# Patient Record
Sex: FEMALE | Race: WHITE | NOT HISPANIC OR LATINO | Employment: OTHER | URBAN - METROPOLITAN AREA
[De-identification: names, ages, dates, MRNs, and addresses within clinical notes are randomized per-mention and may not be internally consistent; named-entity substitution may affect disease eponyms.]

---

## 2021-04-28 NOTE — TELEPHONE ENCOUNTER
FUTURE VISIT INFORMATION      FUTURE VISIT INFORMATION:    Date: 5.3.21    Time: 3:45    Location: Telephone  REFERRAL INFORMATION:    Referring provider:  Dr. Fariba Salazar    Referring providers clinic:  [facility]    Reason for visit/diagnosis  SCC Lower left leg,     RECORDS REQUESTED FROM:       Clinic name Comments Records Status Photos Status   [facility] 4.2.21  Dr. Salazar  Path # [id number] CE Received  Sent to scanning

## 2021-05-03 ENCOUNTER — PRE VISIT (OUTPATIENT)
Dept: DERMATOLOGY | Facility: CLINIC | Age: 71
End: 2021-05-03

## 2021-05-03 ENCOUNTER — VIRTUAL VISIT (OUTPATIENT)
Dept: DERMATOLOGY | Facility: CLINIC | Age: 71
End: 2021-05-03
Payer: MEDICARE

## 2021-05-03 DIAGNOSIS — C44.729 SQUAMOUS CELL CARCINOMA OF SKIN OF LEFT LOWER EXTREMITY: Primary | ICD-10-CM

## 2021-05-03 PROCEDURE — 99442 PR PHYSICIAN TELEPHONE EVALUATION 11-20 MIN: CPT | Mod: 95 | Performed by: DERMATOLOGY

## 2021-05-03 RX ORDER — ALPRAZOLAM 0.5 MG
0.5 TABLET ORAL 2 TIMES DAILY PRN
COMMUNITY
Start: 2021-04-23

## 2021-05-03 RX ORDER — ALBUTEROL SULFATE 90 UG/1
108 AEROSOL, METERED RESPIRATORY (INHALATION) PRN
COMMUNITY
Start: 2020-10-18

## 2021-05-03 RX ORDER — FERROUS GLUCONATE 324(38)MG
324 TABLET ORAL PRN
COMMUNITY
Start: 2020-08-17

## 2021-05-03 ASSESSMENT — PAIN SCALES - GENERAL: PAINLEVEL: NO PAIN (0)

## 2021-05-03 NOTE — PROGRESS NOTES
[facility] Dermatology Note  Encounter Date: May 3, 2021  Store-and-Forward and Telephone ([phone]). Location of teledermatologist: [facility] DERMATOLOGIC SURGERY CLINIC [place].  Start time: 3:10 PM. End time: 3:30 PM.    Dermatology Problem List:  Dermatology Problem List:  1. SCC, left shin, biopsied at [facility] 4/2/21, pending MMS 5/11/21  2. Sebopsoriasis - lidex solution, keto shampoo, lidex shampoo  3. Onychodystrophy / onychomycosis   [place]     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  The patient is aware of the risks, benefits and expectations of this procedure.     Follow-up: 5/11/21 as scheduled for [facility]     Staff and Resident:     Ruby Nina DO (PGY-2)  Dermatology Resident  [facility]    Staff: Dr. Dean Shaikh  ____________________________________________    CC: Derm Problem (consult for left shin scc )    HPI:  Ms. Jessica Odonnell is a(n) 70 year old female who presents today as a new patient for Mohs consultation. She reports a biopsy of the left shin on 4/2/21 with Dr. Fariba Salazar at [facility] which was significant for SCC. She denies any history of skin cancers prior to this biopsy. She reports several recent surgeries, including hip replacement surgery.     Patient is otherwise feeling well, without additional skin concerns.    Labs Reviewed:  Dermatopathology from 4/2/21    Physical Exam:  Vitals: There were no vitals taken for this visit.   SKIN: Teledermatology photos not available. Patient to send photos of left shin via [employer].    Medications:  No current outpatient medications on file.     No current facility-administered medications for this visit.       Past Medical/Surgical History:   There is  no problem list on file for this patient.    No past medical history on file.    CC Fariba Salazar MD  [address] [address] on close of this encounter.

## 2021-05-03 NOTE — NURSING NOTE
Chief Complaint   Patient presents with     Derm Problem     consult for left read scc      Tamera BURROWS CMA

## 2021-05-03 NOTE — LETTER
5/3/2021       RE: Jessica Odonnell  [address] [facility] [address] [facility] [address]     Dear Colleague,    Thank you for referring your patient, Jessica Odonnell, to the [facility] DERMATOLOGIC SURGERY CLINIC [place] at [facility]. Please see a copy of my visit note below.    [facility] Dermatology Note  Encounter Date: May 3, 2021  Store-and-Forward and Telephone ([phone]). Location of teledermatologist: [facility] D[facility]MATOLOG[facility] SURGERY CLINIC [place].  Start time: 3:10 PM. End time: 3:30 PM.    Dermatology Problem List:  Dermatology Problem List:  1. SCC, left shin, biopsied at [facility] 4/2/21, pending MMS 5/11/21  2. Sebopsoriasis - lidex solution, keto shampoo, lidex shampoo  3. Onychodystrophy / onychomycosis   [place]     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  The patient is aware of the risks, benefits and expectations of this procedure.     Follow-up: 5/11/21 as scheduled for [facility]     Staff and Resident:     Ruby Nina DO (PGY-2)  Dermatology Resident  [facility]    Staff: Dr. Dean Shaikh  ____________________________________________    CC: Derm Problem (consult for left shin scc )    HPI:  Ms. Jessica Odonnell is a(n) 70 year old female who presents today as a new patient for Mohs consultation. She reports a biopsy of the left shin on 4/2/21 with Dr. Fariba Salazar at [facility] which was significant for SCC. She denies any history of skin cancers prior to this biopsy. She reports several recent surgeries, including hip replacement surgery.     Patient is otherwise feeling well, without additional skin concerns.    Labs Reviewed:  Dermatopathology from  4/2/21    Physical Exam:  Vitals: There were no vitals taken for this visit.   SKIN: Teledermatology photos not available. Patient to send photos of left shin via [employer].    Medications:  No current outpatient medications on file.     No current facility-administered medications for this visit.       Past Medical/Surgical History:   There is no problem list on file for this patient.    No past medical history on file.    CC Fariba Salazar MD  [address] [place] [id number] on close of this encounter.    Attestation signed by Dean Shaikh MD at 5/12/2021  9:58 AM:  Attending Attestation:  I personally interviewed and examined the patient.  The patient was discussed with the resident.  I reviewed the resident note and agree with the findings.  Any edits are below.    History: SCC on shin    PE: pink papule    A/P: SCC - shin.  Schedule Mohs.  Granulation vs. CLC closure.        Dean Shaikh M.D.  Professor  Director of Dermatologic Surgery  Department of Dermatology

## 2021-05-11 ENCOUNTER — OFFICE VISIT (OUTPATIENT)
Dept: DERMATOLOGY | Facility: CLINIC | Age: 71
End: 2021-05-11
Payer: MEDICARE

## 2021-05-11 VITALS — HEART RATE: 81 BPM | SYSTOLIC BLOOD PRESSURE: 139 MMHG | DIASTOLIC BLOOD PRESSURE: 85 MMHG

## 2021-05-11 DIAGNOSIS — C44.729 SQUAMOUS CELL CARCINOMA OF SKIN OF LEFT LOWER EXTREMITY: Primary | ICD-10-CM

## 2021-05-11 PROCEDURE — 17313 MOHS 1 STAGE T/A/L: CPT | Mod: GC | Performed by: DERMATOLOGY

## 2021-05-11 RX ORDER — ONDANSETRON 4 MG/1
4 TABLET, ORALLY DISINTEGRATING ORAL PRN
COMMUNITY
Start: 2019-06-11

## 2021-05-11 RX ORDER — FLUOCINONIDE TOPICAL SOLUTION USP, 0.05% 0.5 MG/ML
1 SOLUTION TOPICAL DAILY
COMMUNITY
Start: 2020-12-23

## 2021-05-11 RX ORDER — KETOCONAZOLE 20 MG/ML
1 SHAMPOO TOPICAL DAILY
COMMUNITY
Start: 2021-04-28

## 2021-05-11 RX ORDER — TRIAMCINOLONE ACETONIDE 1 MG/G
1 CREAM TOPICAL PRN
COMMUNITY

## 2021-05-11 RX ORDER — OXYCODONE HYDROCHLORIDE 5 MG/1
5 TABLET ORAL PRN
COMMUNITY
Start: 2019-07-09

## 2021-05-11 RX ORDER — DEXAMETHASONE 4 MG/1
110 TABLET ORAL 2 TIMES DAILY
COMMUNITY
Start: 2020-12-23

## 2021-05-11 RX ORDER — PRAMIPEXOLE DIHYDROCHLORIDE 0.12 MG/1
0.12 TABLET ORAL DAILY
COMMUNITY
Start: 2020-07-07

## 2021-05-11 RX ORDER — SUMATRIPTAN 50 MG/1
50 TABLET, FILM COATED ORAL PRN
COMMUNITY
Start: 2020-10-18

## 2021-05-11 RX ORDER — LOSARTAN POTASSIUM 50 MG/1
50 TABLET ORAL DAILY
COMMUNITY
Start: 2020-03-04

## 2021-05-11 RX ORDER — HYDROCORTISONE 25 MG/G
1 OINTMENT TOPICAL PRN
COMMUNITY
Start: 2021-03-30

## 2021-05-11 RX ORDER — HYDROXYZINE PAMOATE 25 MG/1
25 CAPSULE ORAL PRN
COMMUNITY
Start: 2019-05-31

## 2021-05-11 RX ORDER — ACETAMINOPHEN 500 MG
1000 TABLET ORAL DAILY
COMMUNITY
Start: 2019-05-31

## 2021-05-11 ASSESSMENT — PAIN SCALES - GENERAL: PAINLEVEL: NO PAIN (0)

## 2021-05-11 NOTE — NURSING NOTE
Chief Complaint   Patient presents with     Derm Problem     SCC L lower leg     Rachael Hair, EMT

## 2021-05-11 NOTE — LETTER
5/11/2021     RE: Jessica Odonnell  [address] [facility] [address] [facility] [address]     Dear Colleague,    Thank you for referring your patient, Jessica Odonnell, to the [facility] DERMATOLOGIC SURGERY CLINIC [place] at [facility]. Please see a copy of my visit note below.    [facility] Dermatologic Surgery Clinic [place] Procedure Note      Date of Service:  May 11, 2021  Surgery: Mohs micrographic surgery    Case 1  Repair Type: second intent  Repair Size: n/a, second intent  Suture Material: n/a  Tumor Type: SCC  Location: left lower leg  Derm-Path Accession #: [id number]   PreOp Size: 1.5 x 1.5 cm  PostOp Size: 2.5 x 1.8 cm  Mohs Accession #:   Level of Defect: fat    Procedure:  We discussed the principles of treatment and most likely complications including scarring, bleeding, infection, swelling, pain, crusting, nerve damage, large wound,  incomplete excision, wound dehiscence,  nerve damage, recurrence, and a second procedure may be recommended to obtain the best cosmetic or functional result.    Informed consent was obtained and the patient underwent the procedure as follows:  The patient was placed supine on the operating table.  The cancer was identified, outlined with a marker, and verified by the patient.  The entire surgical field was prepped with chlorhexidine.  The surgical site was anesthetized using 1% lidocaine with epinephrine.    The area of clinically apparent tumor was debulked. The layer of tissue was then surgically excised using a #15 blade and was then transferred onto a specimen sheet maintaining the orientation of the specimen. Hemostasis was obtained using electrocautery. The wound site was then covered with a dressing while the tissue samples were processed for examination.    [facility] maintaining the tissue orientation.  The tissue specimen was relaxed  so that the entire surgical margin was in a a single horizontal plane for sectioning and inked for precise mapping.  A precise reference map was drawn to reflect the sectioning of the specimen, colored inking of the margins, and orientation on the patient. The tissue was processed using horizontal sectioning of the base and continuous peripheral margins.  The histopathologic sections were reviewed in conjunction with the reference map.    Total blocks: 1    Total slides:  3    There were no cancer cells visualized on examination, therefore Mohs surgery was complete.    EVALUATION OF MOHS DEFECT FOR RECONSTRUCTION    The patient is status post Mohs' micrographic surgery.  The surgical site was examined with attention to normal anatomic and functional relationships.  After consideration and discussion of multiple options with the patient, it was determined that healing by second intention would offer the best chance for preservation/restoration of all normal anatomic and functional relationships.  The patient verbalized understanding and agrees with this plan. It is also understood that should second intention healing be sub-optimal, additional procedures such as scar revision, steroid injection or dermabrasion may be recommended.    The open wound was cleaned with Chlorhexidine and rinsed with sterile saline, and a thick layer of ointment was applied.  A pressure dressing consisting of non-adherent gauze, gauze and hypafix was applied.  Wound care was discussed with patient both orally and in writing.  The patient stated understanding and agreement of the course of care.    Attestation signed by Dean Shaikh MD at 5/19/2021  9:40 AM:    Attending attestation:  I was present for key elements of the procedure and immediately available for all other portions of the procedure.  I have reviewed the note and edited it as necessary.    Dean Shaikh M.D.  Professor  Director of Dermatologic Surgery  Department of  Dermatology  [facility]    Dermatology Surgery Clinic  [facility]  [address] [place] [id number]

## 2021-05-11 NOTE — PROGRESS NOTES
[facility] Dermatologic Surgery Clinic [place] Procedure Note      Date of Service:  May 11, 2021  Surgery: Mohs micrographic surgery    Case 1  Repair Type: second intent  Repair Size: n/a, second intent  Suture Material: n/a  Tumor Type: SCC  Location: left lower leg  Derm-Path Accession #: [id number]   PreOp Size: 1.5 x 1.5 cm  PostOp Size: 2.5 x 1.8 cm  Mohs Accession #:   Level of Defect: fat      Procedure:  We discussed the principles of treatment and most likely complications including scarring, bleeding, infection, swelling, pain, crusting, nerve damage, large wound,  incomplete excision, wound dehiscence,  nerve damage, recurrence, and a second procedure may be recommended to obtain the best cosmetic or functional result.    Informed consent was obtained and the patient underwent the procedure as follows:  The patient was placed supine on the operating table.  The cancer was identified, outlined with a marker, and verified by the patient.  The entire surgical field was prepped with chlorhexidine.  The surgical site was anesthetized using 1% lidocaine with epinephrine.      The area of clinically apparent tumor was debulked. The layer of tissue was then surgically excised using a #15 blade and was then transferred onto a specimen sheet maintaining the orientation of the specimen. Hemostasis was obtained using electrocautery. The wound site was then covered with a dressing while the tissue samples were processed for examination.    The excised tissue was transported to the Mohs histology laboratory maintaining the tissue orientation.  The tissue specimen was relaxed so that the entire surgical margin was in a a single horizontal plane for sectioning and inked for precise mapping.  A precise reference map was drawn to reflect the sectioning of the specimen, colored inking of the margins, and orientation on the patient. The tissue was processed using horizontal sectioning of the base and  continuous peripheral margins.  The histopathologic sections were reviewed in conjunction with the reference map.    Total blocks: 1    Total slides:  3    There were no cancer cells visualized on examination, therefore Mohs surgery was complete.    EVALUATION OF MOHS DEFECT FOR RECONSTRUCTION    The patient is status post Mohs' micrographic surgery.  The surgical site was examined with attention to normal anatomic and functional relationships.  After consideration and discussion of multiple options with the patient, it was determined that healing by second intention would offer the best chance for preservation/restoration of all normal anatomic and functional relationships.  The patient verbalized understanding and agrees with this plan. It is also understood that should second intention healing be sub-optimal, additional procedures such as scar revision, steroid injection or dermabrasion may be recommended.    The open wound was cleaned with Chlorhexidine and rinsed with sterile saline, and a thick layer of ointment was applied.  A pressure dressing consisting of non-adherent gauze, gauze and hypafix was applied.  Wound care was discussed with patient both orally and in writing.  The patient stated understanding and agreement of the course of care.

## 2021-05-11 NOTE — PATIENT INSTRUCTIONS
"  Post Operative Care for Granulating Site:  After your surgery, a pressure bandage will be placed over the area. This will help prevent bleeding. Please follow these instructions as they will help you to prevent complications as your wound heals.  For the First 24 hours After Surgery:  1. Leave the pressure bandage on and keep it dry. If it should come loose, you may retape it, but do not take it off.  2. Relax and take it easy. Do not do any vigorous exercise, heavy lifting, or bending forward. This could cause the wound to bleed.  3. Post-operative pain is usually mild. You may take plain or extra strength Tylenol every 4 hours as needed. Do not take any medicine that contains aspirin, ibuprofen or motrin.  Avoid alcohol and vitamin E as these may increase your tendency to bleed.  4. You may put an ice pack around the bandaged area for 20 minutes every 2-3 hours. This may help reduce swelling, bruising, and pain. Make sure the ice pack is waterproof so that the pressure bandage does not get wet.   5. You may see a small amount of drainage or blood on your pressure bandage. This is normal. However, if drainage or bleeding continues or saturates the bandage, you will need to apply firm pressure over the bandage with a washcloth for 15 minutes. If bleeding continues after applying pressure for 15 minutes, apply an ice pack to the bandaged area for 15 minutes. If bleeding still continues, go to the nearest emergency room.  24 Hours After Surgery  Carefully remove the bandage and start daily wound care and dressing changes. You may also now shower and get the wound wet. Use caution when washing the wound, be gentle.  Daily Wound Care:    Wash with mild soap and water every day until wound appears well-healed.  Rinse well and pat dry.    Apply Vaseline over site with a Q-tip and Re-apply a dressing until wound appears well-healed.  A well healed wound is \"pinked over\" with a shiny surface.  When area is \"pinked over\" " it is no longer necessary to apply Vaseline.      Call Us If:    You have pain that is not controlled with Tylenol.    You have signs or symptoms of an infection, such as: fever over 100 degrees F, redness, warmth, or foul-smelling or yellow/creamy drainage from the wound.    Who should I call with questions?    [facility]: [phone]     [facility]: [phone]    For urgent needs outside of business hours call the [facility] at [phone] and ask for the dermatology resident on call

## 2021-05-18 ENCOUNTER — DOCUMENTATION ONLY (OUTPATIENT)
Dept: CARE COORDINATION | Facility: CLINIC | Age: 71
End: 2021-05-18

## 2021-05-22 ENCOUNTER — HEALTH MAINTENANCE LETTER (OUTPATIENT)
Age: 71
End: 2021-05-22

## 2021-05-25 ENCOUNTER — VIRTUAL VISIT (OUTPATIENT)
Dept: DERMATOLOGY | Facility: CLINIC | Age: 71
End: 2021-05-25
Payer: MEDICARE

## 2021-05-25 DIAGNOSIS — Z51.89 VISIT FOR WOUND CHECK: Primary | ICD-10-CM

## 2021-05-25 PROCEDURE — 99441 PR PHYSICIAN TELEPHONE EVALUATION 5-10 MIN: CPT | Mod: 95 | Performed by: DERMATOLOGY

## 2021-05-25 ASSESSMENT — PAIN SCALES - GENERAL: PAINLEVEL: NO PAIN (0)

## 2021-05-25 NOTE — PROGRESS NOTES
[facility] Dermatology Note  Encounter Date: May 25, 2021  Store-and-Forward and Telephone ([phone]). Location of teledermatologist: [facility] DERMATOLOGIC SURGERY CLINIC [place].  Start time: 1452. End time: 1459.    Dermatology Problem List:  Dermatology Problem List:  1. SCC, left shin, biopsied at [facility] s/p MMS 5/11/2021  2. Sebopsoriasis - lidex solution, keto shampoo, lidex shampoo  3. Onychodystrophy / onychomycosis   [place]     ____________________________________________    Assessment & Plan:     # SCC, left shin, biopsied at [facility] s/p [facility] 5/11/2021  - Healing well  - Swelling likely related to normal post-operative changes  - Continue with gentle wound care and compression stockings     Procedures Performed:    None    Follow-up: 4 weeks     Staff and Fellow:     Wilfredo Cuello MD    ____________________________________________    CC: Derm Problem (Brionna is following up for wound check, complains of red and swollen feet last night. )    HPI:  Ms. Jessica Odonnell is a(n) 71 year old female who presents today for follow-up  for wound check after MMS on 5/11/2021.     She is doing well and reports no pain and no drainage. She did experience swelling and erythema of the ipsilateral foot last night. The areas appears more normal today.     Patient is otherwise feeling well, without additional skin concerns.      Labs Reviewed:  N/A    Physical Exam:  Vitals: There were no vitals taken for this visit.  SKIN: Teledermatology photos were reviewed; image quality and interpretability: acceptable. Image date: 5/24/2021 and 5/25/2021.  - Over the left shin is a well healing ulceration with healthy appearing base and edges.   - No other lesions of concern on areas examined.     Medications:  Current Outpatient Medications   Medication     acetaminophen (TYLENOL) 500 MG tablet     albuterol (PROAIR HFA/PROVENTIL HFA/VENTOLIN HFA) 108 (90 Base) MCG/ACT inhaler      ALPRAZolam (XANAX) 0.5 MG tablet     ferrous gluconate (FERGON) 324 (38 Fe) MG tablet     FLOVENT  MCG/ACT inhaler     fluocinonide (LIDEX) 0.05 % external solution     hydrocortisone 2.5 % ointment     hydrOXYzine (VISTARIL) 25 MG capsule     ketoconazole (NIZORAL) 2 % external shampoo     losartan (COZAAR) 50 MG tablet     ondansetron (ZOFRAN-ODT) 4 MG ODT tab     oxyCODONE (ROXICODONE) 5 MG tablet     pramipexole (MIRAPEX) 0.125 MG tablet     SUMAtriptan (IMITREX) 50 MG tablet     triamcinolone (KENALOG) 0.1 % external cream     No current facility-administered medications for this visit.       Past Medical/Surgical History:   There is no problem list on file for this patient.    No past medical history on file.    CC No referring provider defined for this encounter. on close of this encounter.

## 2021-05-25 NOTE — LETTER
5/25/2021       RE: Jessica Odonnell  [address] [facility] [address]     Dear Colleague,    Thank you for referring your patient, Jessica Odonnell, to the [facility] DERMATOLOGIC SURGERY CLINIC [place] at [facility]. Please see a copy of my visit note below.    [facility] Dermatology Note  Encounter Date: May 25, 2021  Store-and-Forward and Telephone ([phone]). Location of teledermatologist: [facility] DERMATOLOGIC SURGERY CLINIC [place].  Start time: 1452. End time: 1459.    Dermatology Problem List:  Dermatology Problem List:  1. SCC, left shin, biopsied at [facility] s/p MMS 5/11/2021  2. Sebopsoriasis - lidex solution, keto shampoo, lidex shampoo  3. Onychodystrophy / onychomycosis   [place]     ____________________________________________    Assessment & Plan:     # SCC, left shin, biopsied at [facility] s/p MMS 5/11/2021  - Healing well  - Swelling likely related to normal post-operative changes  - Continue with gentle wound care and compression stockings     Procedures Performed:    None    Follow-up: 4 weeks     Staff and Fellow:     Wilfredo Cuello MD    ____________________________________________    CC: Derm Problem (Brionna is following up for wound check, complains of red and swollen feet last night. )    HPI:  Ms. Jessica Odonnell is a(n) 71 year old female who presents today for follow-up  for wound check after MMS on 5/11/2021.     She is doing well and reports no pain and no drainage. She did experience swelling and erythema of the ipsilateral foot last night. The areas appears more normal today.     Patient is otherwise feeling well, without additional skin concerns.      Labs Reviewed:  N/A    Physical Exam:  Vitals: There were no vitals taken for this visit.  SKIN: Teledermatology photos were reviewed; image quality and interpretability: acceptable. Image date: 5/24/2021 and  5/25/2021.  - Over the left shin is a well healing ulceration with healthy appearing base and edges.   - No other lesions of concern on areas examined.     Medications:  Current Outpatient Medications   Medication     acetaminophen (TYLENOL) 500 MG tablet     albuterol (PROAIR HFA/PROVENTIL HFA/VENTOLIN HFA) 108 (90 Base) MCG/ACT inhaler     ALPRAZolam (XANAX) 0.5 MG tablet     ferrous gluconate (FERGON) 324 (38 Fe) MG tablet     FLOVENT  MCG/ACT inhaler     fluocinonide (LIDEX) 0.05 % external solution     hydrocortisone 2.5 % ointment     hydrOXYzine (VISTARIL) 25 MG capsule     ketoconazole (NIZORAL) 2 % external shampoo     losartan (COZAAR) 50 MG tablet     ondansetron (ZOFRAN-ODT) 4 MG ODT tab     oxyCODONE (ROXICODONE) 5 MG tablet     pramipexole (MIRAPEX) 0.125 MG tablet     SUMAtriptan (IMITREX) 50 MG tablet     triamcinolone (KENALOG) 0.1 % external cream     No current facility-administered medications for this visit.       Past Medical/Surgical History:   There is no problem list on file for this patient.    No past medical history on file.    CC No referring provider defined for this encounter. on close of this encounter.    Attestation signed by Dean Shaikh MD at 6/8/2021  9:11 AM:  Attending Attestation:  I personally interviewed and examined the patient.  The patient was discussed with the resident.  I reviewed the resident note and agree with the findings.  Any edits are below.    History: Healing wound on shin    PE: wound filled with granulation tisse    A/P: Well healing wound after Mohs for SCC -- continue wound care.  RTC 2-4 weeks.      Dean Shaikh M.D.  Professor  Director of Dermatologic Surgery  Department of Dermatology

## 2021-05-25 NOTE — PATIENT INSTRUCTIONS
[facility] Dermatology Visit    Thank you for allowing us to participate in your care. Your findings, instructions and follow-up plan are as follows:         When should I call my doctor?    If you are worsening or not improving, please, contact us or seek urgent care as noted below.     Who should I call with questions (adults)?    [facility] (adult and pediatric): [phone]     [facility] (adult): [phone]    For urgent needs outside of business hours call the [facility] at [phone] and ask for the dermatology resident on call    If this is a medical emergency and you are unable to reach an ER, Call 911      Who should I call with questions (pediatric)?  [facility]- Pediatric Dermatology  Dr. Rocio Muller, Dr. Haydee Yadav, Dr. Yelena Michaels, Martita Lees, PA  Dr. Paola Villavicencio, Dr. Hina Ortiz & Dr. Jonathan Covington  Non Urgent  Nurse Triage Line; [phone]- Coco and Arminda RN Care Coordinators   Brit (/Complex ) [phone]    If you need a prescription refill, please contact your pharmacy. Refills are approved or denied by our Physicians during normal business hours, Monday through Fridays  Per office policy, refills will not be granted if you have not been seen within the past year (or sooner depending on your child's condition)    Scheduling Information:  Pediatric Appointment Scheduling and Call Center [phone]  Radiology Scheduling- [phone]  Sedation Unit Scheduling- [phone]  [place] Scheduling- General [phone]; Pediatric Dermatology [phone]  Main  Services: [phone]  Japanese: [phone]  Dominican: [phone]  Hmong/Tamazight/Wolof: [phone]  Preadmission Nursing Department Fax Number: [phone] (Fax all pre-operative paperwork to this number)    For urgent matters arising during evenings,  weekends, or holidays that cannot wait for normal business hours please call [phone] and ask for the Dermatology Resident On-Call to be paged.

## 2021-05-25 NOTE — NURSING NOTE
Chief Complaint   Patient presents with     Derm Problem     Brionna is following up for wound check, complains of red and swollen feet last night.      Mariah Gramajo LPN

## 2021-06-09 ENCOUNTER — VIRTUAL VISIT (OUTPATIENT)
Dept: DERMATOLOGY | Facility: CLINIC | Age: 71
End: 2021-06-09
Payer: MEDICARE

## 2021-06-09 DIAGNOSIS — Z51.89 VISIT FOR WOUND CHECK: Primary | ICD-10-CM

## 2021-06-09 PROCEDURE — 99442 PR PHYSICIAN TELEPHONE EVALUATION 11-20 MIN: CPT | Mod: 95 | Performed by: DERMATOLOGY

## 2021-06-09 ASSESSMENT — PAIN SCALES - GENERAL: PAINLEVEL: NO PAIN (0)

## 2021-06-09 NOTE — PROGRESS NOTES
"[facility] Dermatology Note  Encounter Date: Jun 9, 2021  Store-and-Forward and Telephone ([phone]). Location of teledermatologist: [facility] DERMATOLOGIC SURGERY CLINIC [place].  Start time: 1450. End time: 1505.    Dermatology Problem List:  1. SCC, left shin, biopsied at [facility] s/p [facility] 5/11/2021  2. Sebopsoriasis - lidex solution, keto shampoo, lidex shampoo  3. Onychodystrophy / onychomycosis   [place]     ____________________________________________    Assessment & Plan:     # SCC, left shin, biopsied at [facility] s/p [facility] 5/11/2021  - Healing well by secondary intention. Expect wound to close in the next 4-6 weeks.   - Continue gentle wound care    Procedures Performed:    None    Follow-up: 4 weeks    Staff and Fellow:     Wilfredo Cuello MD    ____________________________________________    CC: Derm Problem (follow up wound check, thinks the area looks \"yucky\")    HPI:  Ms. Jessica Odonnell is a(n) 71 year old female who presents today for follow-up  for wound of the left shin, healing by secondary intention. She denies pain or increased drainage/erythema.     Patient is otherwise feeling well, without additional skin concerns.    Labs Reviewed:  N/A    Physical Exam:  Vitals: There were no vitals taken for this visit.  SKIN: Teledermatology photos were reviewed; image quality and interpretability: acceptable. Image date: 6/9/2021.  - Over the left shin is a well healing, shallow ulceration of the skin with healthy appearing fibro-adipose tissue at the base  - No other lesions of concern on areas examined.     Medications:  Current Outpatient Medications   Medication     acetaminophen (TYLENOL) 500 MG tablet     albuterol (PROAIR HFA/PROVENTIL HFA/VENTOLIN HFA) 108 (90 Base) MCG/ACT inhaler     ALPRAZolam (XANAX) 0.5 MG tablet     ferrous gluconate (FERGON) 324 (38 Fe) MG tablet     FLOVENT  MCG/ACT inhaler     fluocinonide (LIDEX) 0.05 % external " solution     hydrocortisone 2.5 % ointment     hydrOXYzine (VISTARIL) 25 MG capsule     ketoconazole (NIZORAL) 2 % external shampoo     losartan (COZAAR) 50 MG tablet     ondansetron (ZOFRAN-ODT) 4 MG ODT tab     oxyCODONE (ROXICODONE) 5 MG tablet     pramipexole (MIRAPEX) 0.125 MG tablet     SUMAtriptan (IMITREX) 50 MG tablet     triamcinolone (KENALOG) 0.1 % external cream     No current facility-administered medications for this visit.       Past Medical/Surgical History:   There is no problem list on file for this patient.    No past medical history on file.    CC No referring provider defined for this encounter. on close of this encounter.

## 2021-06-09 NOTE — NURSING NOTE
"Chief Complaint   Patient presents with     Derm Problem     follow up wound check, thinks the area looks \"yucky\"     Rachael Hair, EMT    "

## 2021-06-09 NOTE — LETTER
"6/9/2021       RE: Jessica Odonnell  [address] [address]     Dear Colleague,    Thank you for referring your patient, Jessica Odonnell, to the [facility] DERMATOLOGIC SURGERY CLINIC [place] at [facility]. Please see a copy of my visit note below.    [facility] Dermatology Note  Encounter Date: Jun 9, 2021  Store-and-Forward and Telephone ([phone]). Location of teledermatologist: [facility] DERMATOLOGIC SURGERY CLINIC [place].  Start time: 1450. End time: 1505.    Dermatology Problem List:  1. SCC, left shin, biopsied at [facility] 5/11/2021  2. Sebopsoriasis - lidex solution, keto shampoo, lidex shampoo  3. Onychodystrophy / onychomycosis   [place]     ____________________________________________    Assessment & Plan:     # SCC, left shin, biopsied at [facility] 5/11/2021  - Healing well by secondary intention. Expect wound to close in the next 4-6 weeks.   - Continue gentle wound care    Procedures Performed:    None    Follow-up: 4 weeks    Staff and Fellow:     Wilfredo Cuello MD    ____________________________________________    CC: Derm Problem (follow up wound check, thinks the area looks \"yucky\")    HPI:  Ms. Jessica Odonnell is a(n) 71 year old female who presents today for follow-up  for wound of the left shin, healing by secondary intention. She denies pain or increased drainage/erythema.     Patient is otherwise feeling well, without additional skin concerns.    Labs Reviewed:  N/A    Physical Exam:  Vitals: There were no vitals taken for this visit.  SKIN: Teledermatology photos were reviewed; image quality and interpretability: acceptable. Image date: 6/9/2021.  - Over the left shin is a well healing, shallow ulceration of the skin with healthy appearing fibro-adipose tissue at the base  - No other lesions of concern on areas examined. "     Medications:  Current Outpatient Medications   Medication     acetaminophen (TYLENOL) 500 MG tablet     albuterol (PROAIR HFA/PROVENTIL HFA/VENTOLIN HFA) 108 (90 Base) MCG/ACT inhaler     ALPRAZolam (XANAX) 0.5 MG tablet     ferrous gluconate (FERGON) 324 (38 Fe) MG tablet     FLOVENT  MCG/ACT inhaler     fluocinonide (LIDEX) 0.05 % external solution     hydrocortisone 2.5 % ointment     hydrOXYzine (VISTARIL) 25 MG capsule     ketoconazole (NIZORAL) 2 % external shampoo     losartan (COZAAR) 50 MG tablet     ondansetron (ZOFRAN-ODT) 4 MG ODT tab     oxyCODONE (ROXICODONE) 5 MG tablet     pramipexole (MIRAPEX) 0.125 MG tablet     SUMAtriptan (IMITREX) 50 MG tablet     triamcinolone (KENALOG) 0.1 % external cream     No current facility-administered medications for this visit.       Past Medical/Surgical History:   There is no problem list on file for this patient.    No past medical history on file.    CC No referring provider defined for this encounter. on close of this encounter.    Attestation signed by Dean Shaikh MD at 6/22/2021 11:32 AM:  Attending Attestation:  I personally interviewed and examined the patient.  The patient was discussed with the resident.  I reviewed the resident note and agree with the findings.  Any edits are below.    History: SCC on shin s/p Mohs.  Opted for granulation    PE: well healing wound on LE    A/P: S/P Mohs for SCC with granulating wound.  Photos reviewed.  Healing well.  Continue wound care.      Dean Shaikh M.D.  Professor  Director of Dermatologic Surgery  Department of Dermatology

## 2021-07-14 ENCOUNTER — VIRTUAL VISIT (OUTPATIENT)
Dept: DERMATOLOGY | Facility: CLINIC | Age: 71
End: 2021-07-14
Payer: MEDICARE

## 2021-07-14 DIAGNOSIS — Z51.89 VISIT FOR WOUND CHECK: Primary | ICD-10-CM

## 2021-07-14 PROCEDURE — 99441 PR PHYSICIAN TELEPHONE EVALUATION 5-10 MIN: CPT | Mod: 95 | Performed by: DERMATOLOGY

## 2021-07-14 NOTE — LETTER
7/14/2021       RE: Jessica Odonnell  [address] [address]     Dear Colleague,    Thank you for referring your patient, Jessica Odonnell, to the [facility] DERMATOLOGIC SURGERY CLINIC [place] at [facility]. Please see a copy of my visit note below.    Dermatologic Surgery Clinic Note - Teledermatology Visit    Jul 14, 2021    Start time: 2:00 p.m.  End time: 2:10 p.m.    Dermatology Problem List:  1. SCC, left shin, s/p [facility]MS 5/11/2021 healing with granulation per patient preference  2. Sebopsoriasis - lidex solution, keto shampoo, lidex shampoo  3. Onychodystrophy / onychomycosis   [place]     Subjective: The patient is a 71 year old woman who presents today for a wound check after recent dermatologic surgery. No concerns for infection. The patient has been taking care of the wound as recommended. She says that the wound appears to healing very slowly but does seem to be healing.    No other associated symptoms, modifying factors, or prior treatments, except when noted above. The patient denies any constitutional symptoms, lymphadenopathy, unintentional weight loss or decreased appetite. No other skin concerns today.    Objective:   Skin: Focused examination of the left shin within the teledermatology photograph(s) on 7/14/21 was performed.   - The surgical site noted above is clean and without erythema or developing infection. There is no surrounding erythema, purulence, or significant tenderness to palpation. No clinical evidence of infection noted today.    Assessment and Plan:     - The patient's surgery site(s) is/are healing very well. No evidence of infection on examination today.  - The patient was told to continue with wound cares until the area(s) is/are no longer crusted.   - The patient should follow up with dermatologic surgery PRN, as well as continue with regular skin exams in general dermatology  clinic.    The patient was discussed with and evaluated by attending physician, Dean Shaikh MD.    Jamal Mccord MD  Micrographic Surgery and Dermatologic Oncology (MSDO) Fellow        Attestation signed by Dean Shaikh MD at 8/9/2021  2:30 PM:  Attending Attestation:  I personally interviewed and examined the patient.  The patient was discussed with the resident.  I reviewed the resident note and agree with the findings.  Any edits are below.    History: Granulating wound on LE    PE: pictures show well healing wound    A/P: Well healing wound after SCCIS excision by Mohs and granulation.  Continue wound care.      Dean Shaikh M.D.  Professor  Director of Dermatologic Surgery  Department of Dermatology

## 2021-07-14 NOTE — PROGRESS NOTES
Dermatologic Surgery Clinic Note - Teledermatology Visit    Jul 14, 2021    Start time: 2:00 p.m.  End time: 2:10 p.m.    Dermatology Problem List:  1. SCC, left shin, s/p MMS 5/11/2021 healing with granulation per patient preference  2. Sebopsoriasis - lidex solution, keto shampoo, lidex shampoo  3. Onychodystrophy / onychomycosis   [place]     Subjective: The patient is a 71 year old woman who presents today for a wound check after recent dermatologic surgery. No concerns for infection. The patient has been taking care of the wound as recommended. She says that the wound appears to healing very slowly but does seem to be healing.    No other associated symptoms, modifying factors, or prior treatments, except when noted above. The patient denies any constitutional symptoms, lymphadenopathy, unintentional weight loss or decreased appetite. No other skin concerns today.    Objective:   Skin: Focused examination of the left shin within the teledermatology photograph(s) on 7/14/21 was performed.   - The surgical site noted above is clean and without erythema or developing infection. There is no surrounding erythema, purulence, or significant tenderness to palpation. No clinical evidence of infection noted today.    Assessment and Plan:     - The patient's surgery site(s) is/are healing very well. No evidence of infection on examination today.  - The patient was told to continue with wound cares until the area(s) is/are no longer crusted.   - The patient should follow up with dermatologic surgery PRN, as well as continue with regular skin exams in general dermatology clinic.    The patient was discussed with and evaluated by attending physician, Dean Shaikh MD.    Jamal Mccord MD  Micrographic Surgery and Dermatologic Oncology (MSDO) Fellow

## 2021-07-14 NOTE — Clinical Note
Can we schedule this patient for a wound check via telephone visit with photos within 2-3 weeks? Thank you!!

## 2021-07-27 ENCOUNTER — VIRTUAL VISIT (OUTPATIENT)
Dept: DERMATOLOGY | Facility: CLINIC | Age: 71
End: 2021-07-27
Payer: MEDICARE

## 2021-07-27 DIAGNOSIS — Z51.89 VISIT FOR WOUND CHECK: ICD-10-CM

## 2021-07-27 DIAGNOSIS — C44.729 SQUAMOUS CELL CARCINOMA OF SKIN OF LEFT LOWER EXTREMITY: Primary | ICD-10-CM

## 2021-07-27 PROCEDURE — 99441 PR PHYSICIAN TELEPHONE EVALUATION 5-10 MIN: CPT | Mod: 95 | Performed by: DERMATOLOGY

## 2021-07-27 ASSESSMENT — PAIN SCALES - GENERAL: PAINLEVEL: NO PAIN (0)

## 2021-07-27 NOTE — NURSING NOTE
Chief Complaint   Patient presents with     Derm Problem     no concerns regarding wound healing, other than it taking a long time. No pain, and she has not noticed any signs of infection     Rachael Hair EMT

## 2021-07-27 NOTE — PATIENT INSTRUCTIONS
[facility] Dermatology Visit    Thank you for allowing us to participate in your care. Your findings, instructions and follow-up plan are as follows:         When should I call my doctor?    If you are worsening or not improving, please, contact us or seek urgent care as noted below.     Who should I call with questions (adults)?    [facility] (adult and pediatric): [phone]     [facility] (adult): [phone]    For urgent needs outside of business hours call the [facility] at [phone] and ask for the dermatology resident on call    If this is a medical emergency and you are unable to reach an ER, Call 911      Who should I call with questions (pediatric)?  [facility]- Pediatric Dermatology  Dr. Rocio Muller, Dr. Haydee Yadav, Dr. Yelena Michaels, Martita Lees, PA  Dr. Paola Villavicencio, Dr. Hina Ortiz & Dr. Jonathan Covington  Non Urgent  Nurse Triage Line; [phone]- Coco and Arminda RN Care Coordinators   Brit (/Complex ) [phone]    If you need a prescription refill, please contact your pharmacy. Refills are approved or denied by our Physicians during normal business hours, Monday through Fridays  Per office policy, refills will not be granted if you have not been seen within the past year (or sooner depending on your child's condition)    Scheduling Information:  Pediatric Appointment Scheduling and Call Center [phone]  Radiology Scheduling- [phone]  Sedation Unit Scheduling- [phone]  [place] Scheduling- General [phone]; Pediatric Dermatology [phone]  Main  Services: [phone]  Upper sorbian: [phone]  Israeli: [phone]  Hmong/Ukrainian/Kiswahili: [phone]  Preadmission Nursing Department Fax Number: [phone] (Fax all pre-operative paperwork to this number)    For urgent matters arising during evenings,  weekends, or holidays that cannot wait for normal business hours please call [phone] and ask for the Dermatology Resident On-Call to be paged.

## 2021-07-27 NOTE — PROGRESS NOTES
Dermatologic Surgery Clinic Note - Teledermatology Visit    Jul 27, 2021  Start time: 3:45 p.m.  End time: 3:55 p.m.    Dermatology Problem List:  1. SCC, left shin, s/p MMS 5/11/2021 healing with granulation per patient preference  2. Sebopsoriasis - lidex solution, keto shampoo, lidex shampoo  3. Onychodystrophy / onychomycosis   [place]     Subjective: The patient is a 71 year old woman who presents today for follow-up after recent dermatologic surgery/wound check. The patient has no concerns for surgical wound at this time. Pain is well controlled. No concern for infection.    No other associated symptoms, modifying factors, or prior treatments, except when noted above. The patient denies any constitutional symptoms, lymphadenopathy, unintentional weight loss or decreased appetite. No other skin concerns today.    Objective:   Skin: Focused examination of the left shin within the teledermatology photograph(s) on 7/26/21 was performed.   - The surgical site noted above is clean, dry, and intact. There is no surrounding erythema, purulence, or significant tenderness to palpation. No clinical evidence of infection noted today.    Assessment and Plan:     - The patient's surgery site(s) is/are healing very well. No evidence of infection on examination today.  - The patient was told to continue with wound cares until the area(s) is/are no longer crusted.   - The patient should follow up with dermatologic surgery PRN, as well as continue with regular skin exams in general dermatology clinic.    The patient was discussed with and evaluated by attending physician, Dean Shaikh MD.    Jamal Mccord MD  Micrographic Surgery and Dermatologic Oncology (MSDO) Fellow

## 2021-07-27 NOTE — LETTER
7/27/2021       RE: Jessica Odonnell  [address] [address]     Dear Colleague,    Thank you for referring your patient, Jessica Odonnell, to the [facility] DERMATOLOGIC SURGERY CLINIC [place] at [facility]. Please see a copy of my visit note below.    Dermatologic Surgery Clinic Note - Teledermatology Visit    Jul 27, 2021  Start time: 3:45 p.m.  End time: 3:55 p.m.    Dermatology Problem List:  1. SCC, left shin, s/p MMS 5/11/2021 healing with granulation per patient preference  2. Sebopsoriasis - lidex solution, keto shampoo, lidex shampoo  3. Onychodystrophy / onychomycosis   [place]     Subjective: The patient is a 71 year old woman who presents today for follow-up after recent dermatologic surgery/wound check. The patient has no concerns for surgical wound at this time. Pain is well controlled. No concern for infection.    No other associated symptoms, modifying factors, or prior treatments, except when noted above. The patient denies any constitutional symptoms, lymphadenopathy, unintentional weight loss or decreased appetite. No other skin concerns today.    Objective:   Skin: Focused examination of the left shin within the teledermatology photograph(s) on 7/26/21 was performed.   - The surgical site noted above is clean, dry, and intact. There is no surrounding erythema, purulence, or significant tenderness to palpation. No clinical evidence of infection noted today.    Assessment and Plan:     - The patient's surgery site(s) is/are healing very well. No evidence of infection on examination today.  - The patient was told to continue with wound cares until the area(s) is/are no longer crusted.   - The patient should follow up with dermatologic surgery PRN, as well as continue with regular skin exams in general dermatology clinic.    The patient was discussed with and evaluated by attending physician, Dean  MD Neel.    Jamal Mccord MD  Micrographic Surgery and Dermatologic Oncology (MSDO) Fellow        Attestation signed by Dean Shaikh MD at 8/11/2021 10:38 AM:  Attending Attestation:  I personally interviewed and examined the patient.  The patient was discussed with the resident.  I reviewed the resident note and agree with the findings.  Any edits are below.    History: granulating wound on leg    PE: healing well    A/P: Granulating wound after SCCIS removal by Mohs -- continue wound care.      Dean Shaikh M.D.  Professor  Director of Dermatologic Surgery  Department of Dermatology

## 2021-09-11 ENCOUNTER — HEALTH MAINTENANCE LETTER (OUTPATIENT)
Age: 71
End: 2021-09-11

## 2022-04-23 ENCOUNTER — HEALTH MAINTENANCE LETTER (OUTPATIENT)
Age: 72
End: 2022-04-23

## 2022-06-18 ENCOUNTER — HEALTH MAINTENANCE LETTER (OUTPATIENT)
Age: 72
End: 2022-06-18

## 2022-10-30 ENCOUNTER — HEALTH MAINTENANCE LETTER (OUTPATIENT)
Age: 72
End: 2022-10-30

## 2023-06-25 ENCOUNTER — HEALTH MAINTENANCE LETTER (OUTPATIENT)
Age: 73
End: 2023-06-25